# Patient Record
Sex: FEMALE | URBAN - METROPOLITAN AREA
[De-identification: names, ages, dates, MRNs, and addresses within clinical notes are randomized per-mention and may not be internally consistent; named-entity substitution may affect disease eponyms.]

---

## 2022-02-21 ENCOUNTER — COSMETIC (OUTPATIENT)
Dept: PLASTIC SURGERY | Facility: CLINIC | Age: 32
End: 2022-02-21

## 2022-02-21 VITALS
RESPIRATION RATE: 16 BRPM | HEART RATE: 87 BPM | TEMPERATURE: 97.5 F | SYSTOLIC BLOOD PRESSURE: 140 MMHG | WEIGHT: 184.8 LBS | DIASTOLIC BLOOD PRESSURE: 101 MMHG

## 2022-02-21 DIAGNOSIS — Z41.1 ENCOUNTER FOR COSMETIC PROCEDURE: Primary | ICD-10-CM

## 2022-02-21 PROCEDURE — RECHECK: Performed by: PLASTIC SURGERY

## 2022-02-21 RX ORDER — AZITHROMYCIN 250 MG/1
250 TABLET, FILM COATED ORAL AS NEEDED
COMMUNITY
Start: 2022-01-18

## 2022-02-21 RX ORDER — BIOTIN 10 MG
10 TABLET ORAL DAILY
COMMUNITY

## 2022-02-21 RX ORDER — BENZONATATE 100 MG/1
100 CAPSULE ORAL 2 TIMES DAILY
COMMUNITY
Start: 2022-01-15 | End: 2022-04-11 | Stop reason: ALTCHOICE

## 2022-02-21 RX ORDER — ALBUTEROL SULFATE 90 UG/1
2 AEROSOL, METERED RESPIRATORY (INHALATION) 2 TIMES DAILY
COMMUNITY
Start: 2022-01-11 | End: 2022-02-21

## 2022-02-21 RX ORDER — ALBUTEROL SULFATE 90 UG/1
1 AEROSOL, METERED RESPIRATORY (INHALATION) AS NEEDED
COMMUNITY
Start: 2022-02-18 | End: 2022-02-21

## 2022-02-21 RX ORDER — LORAZEPAM 0.5 MG/1
0.5 TABLET ORAL DAILY
COMMUNITY
End: 2022-02-21

## 2022-02-21 RX ORDER — ANTIARTHRITIC COMBINATION NO.2 900 MG
250 TABLET ORAL DAILY
COMMUNITY

## 2022-02-21 RX ORDER — METHYLPREDNISOLONE 4 MG/1
4 TABLET ORAL DAILY
COMMUNITY
Start: 2022-01-18 | End: 2022-04-11 | Stop reason: ALTCHOICE

## 2022-02-21 RX ORDER — CYCLOBENZAPRINE HCL 5 MG
5 TABLET ORAL 3 TIMES DAILY
COMMUNITY
Start: 2021-11-22 | End: 2022-02-21

## 2022-02-21 NOTE — PROGRESS NOTES
Patient is a 79-year-old female who is known body contouring surgery from my previous practice  She was initially scheduled for a follow-up procedure for bilateral mastopexy liposuction and Brazilian butt lift with fat grafting to the breast   She was scheduled however due to the relocation of my practice her surgery was canceled and patient was rescheduled  Patient desired to follow me to my new practice in contacted my new office, she is here today to establish care and would  like to discuss her procedure as well as additional liposuction  The patient is interested in liposuction to the arms and inner thigh as well as more liposuction to her back  Physical exam--abdomen with excellent shape and contour, she has localized adiposity to the upper arm mainly the axillary region, inner thigh and mild residual localized adiposity in the upper flank  She overall has a very nice shape to her buttock but does have some decreased projection  Bilateral breasts with a grade 3 ptosis, she does have good shape and volume to her breasts, she has no palpable masses or adenopathy  She has good skin tone to her arms, she has good to moderate skin tone to the thighs    Discussion--I discussed with the patient that again she has overall very good candidate for suction assisted lipectomy and fat grafting to the buttock, I did discuss with her that her arms and thighs must be approached judiciously as she is at increased risk for loose hanging skin and may need a separate surgical procedure to correct if the skin does not retract, the patient understands and agrees is comfortable with that  Regarding her breast, the patient is an excellent candidate for mastopexy, liposuction and fat grafting to her breast and buttock    We once again reviewed the risks, benefits, alternatives, all the patient's questions were answered to her satisfaction, she will be provided pricing information literature regarding the procedure  Counseling dominated visit: Total counseling time 30 minutes, total visit time 35 minutes

## 2022-03-21 ENCOUNTER — COSMETIC (OUTPATIENT)
Dept: PLASTIC SURGERY | Facility: CLINIC | Age: 32
End: 2022-03-21

## 2022-03-21 VITALS
HEART RATE: 94 BPM | WEIGHT: 188 LBS | DIASTOLIC BLOOD PRESSURE: 72 MMHG | SYSTOLIC BLOOD PRESSURE: 111 MMHG | HEIGHT: 64 IN | TEMPERATURE: 97.8 F | BODY MASS INDEX: 32.1 KG/M2

## 2022-03-21 DIAGNOSIS — Z41.1 ENCOUNTER FOR COSMETIC PROCEDURE: Primary | ICD-10-CM

## 2022-03-21 PROCEDURE — RECHECK: Performed by: PLASTIC SURGERY

## 2022-03-21 NOTE — PROGRESS NOTES
Assessment/Plan   21-year-old female for mastopexy, and fat grafting to the bilateral breasts and buttock  1 )  We discussed the risks, benefits, alternatives of mastopexy and fat grafting the bilateral breast and buttock and we identified her donor sites which would be her abdomen flanks arms and thighs   2 )  Informed consent obtained and signed   3  )  Labs pending, pictures taken  4 )  Will obtain breast measurements day of surgery      Discussion- I discussed with her the risks, benefits, alternatives of mastopexy including the risk of bleeding, infection, scarring, poor wound healing, demonstrating an underlying structures, poor aesthetic result, I discussed with her that her breasts are somewhat asymmetric now and he will have some degree of asymmetry postoperatively, I discussed the risk of change in nipple sensation, loss of nipple, damage to the nipple-areolar complex, seroma, DVT, poor aesthetic result, need for revision, need for multiple procedures, I discussed with him the expected recovery time, the expected hospital stay  I discussed with her the difference between mastopexy and a formal breast reduction, pictures were used to aid the patient's understanding  We discussed fat grafting to the bilateral upper pole of the breast as well as fat grafting to the buttock  I discussed with her the risks would include the above as well as the risk of under correction, over-correction fat resorption, contour deformity, fat necrosis, poor aesthetic result, asymmetry, the risk of fat embolus, all the patient's questions were answered to her satisfaction informed consent obtained and signed, we will proceed to the OR as scheduled  Subjective   Patient ID: Akil Daniel is a 36 y o  female  There were no vitals filed for this visit    HPI patient is a 21-year-old female known to me for previous abdominoplasty, the patient was very happy with her results and saw me in consultation for mastopexy with fat grafting to the bilateral breast and buttock  The patient has no new changes to her history, she is on no new medication, she is a nonsmoker, she does not take steroids or blood thinners, she is otherwise doing well  The following portions of the patient's history were reviewed and updated as appropriate: allergies, current medications, past family history, past medical history, past social history, past surgical history and problem list     Review of Systems   Constitutional: Negative  Objective   Physical Exam   Constitutional  She appears well-developed and well-nourished  Eyes  Pupils are equal, round, and reactive to light  System normal      General -             Right: Right eye extraocular movements are normal              Left: Left eye extraocular movements are normal      Cardiovascular: Normal rate, regular rhythm, normal heart sounds and intact distal pulses  Pulmonary/Chest  Effort normal and breath sounds normal      Skin  Skin is warm  Psychiatric  She has a normal mood and affect  Her behavior is normal  Judgment and thought content normal    Abdomen, soft, nontender, nondistended, plication intact  Bilateral breast--grade 3 ptosis, no palpable masses or adenopathy   Skin- localized adiposity to the flanks, upper back, arms and inner thighs, she also has relative decreased projection to her buttock    No past medical history on file  No past surgical history on file    Current Outpatient Medications   Medication Instructions    azithromycin (ZITHROMAX) 250 mg, Oral, As needed    benzonatate (TESSALON PERLES) 100 mg, Oral, 2 times daily    Biotin 10 mg, Oral, Daily    methylPREDNISolone 4 mg, Oral, Daily, Use as directed    Multiple Vitamins-Minerals (Womens Multi) CAPS 250 mg, Oral, Daily       Social History     Social History Narrative    Not on file     Social History     Tobacco Use   Smoking Status Never Smoker   Smokeless Tobacco Never Used

## 2022-04-11 RX ORDER — LORAZEPAM 0.5 MG/1
TABLET ORAL EVERY 8 HOURS PRN
COMMUNITY

## 2022-04-11 RX ORDER — ALBUTEROL SULFATE 90 UG/1
2 AEROSOL, METERED RESPIRATORY (INHALATION) EVERY 6 HOURS PRN
COMMUNITY

## 2022-04-11 NOTE — PRE-PROCEDURE INSTRUCTIONS
Pre-Surgery Instructions:   Medication Instructions    albuterol (PROVENTIL HFA,VENTOLIN HFA) 90 mcg/act inhaler Instructed patient per Anesthesia Guidelines  prn,may use    LORazepam (ATIVAN) 0 5 mg tablet Instructed patient per Anesthesia Guidelines  prn,may use    You will receive a phone call from hospital for arrival time  Please call surgeons office if any changes in your condition  Wear easy on/off clothing; consider type of surgery;  Valuables, jewelry, piercing's please keep at home  **COVID-19  education/surgical guidelines  Updated covid    Visitation policy  Please: No contact lenses or eye make up, artificial eyelashes    Please secure transportation     Follow pre surgery showering or cleaning instructions as  Reviewed by nurse or surgeons office      Questions answered and concerns addressed

## 2022-04-13 ENCOUNTER — ANESTHESIA EVENT (OUTPATIENT)
Dept: PERIOP | Facility: HOSPITAL | Age: 32
End: 2022-04-13
Payer: SELF-PAY

## 2022-04-14 ENCOUNTER — HOSPITAL ENCOUNTER (OUTPATIENT)
Facility: HOSPITAL | Age: 32
Setting detail: OUTPATIENT SURGERY
Discharge: HOME | End: 2022-04-14
Attending: PLASTIC SURGERY | Admitting: PLASTIC SURGERY
Payer: SELF-PAY

## 2022-04-14 ENCOUNTER — ANESTHESIA (OUTPATIENT)
Dept: PERIOP | Facility: HOSPITAL | Age: 32
End: 2022-04-14
Payer: SELF-PAY

## 2022-04-14 VITALS
TEMPERATURE: 97.2 F | HEART RATE: 99 BPM | SYSTOLIC BLOOD PRESSURE: 142 MMHG | DIASTOLIC BLOOD PRESSURE: 77 MMHG | OXYGEN SATURATION: 97 % | BODY MASS INDEX: 32.56 KG/M2 | RESPIRATION RATE: 18 BRPM | HEIGHT: 64 IN | WEIGHT: 190.7 LBS

## 2022-04-14 DIAGNOSIS — Z41.1 ENCOUNTER FOR COSMETIC PROCEDURE: ICD-10-CM

## 2022-04-14 LAB
ABO GROUP BLD: NORMAL
ABO GROUP BLD: NORMAL
ALBUMIN SERPL BCP-MCNC: 3.9 G/DL (ref 3.5–5)
ALP SERPL-CCNC: 65 U/L (ref 46–116)
ALT SERPL W P-5'-P-CCNC: 27 U/L (ref 12–78)
ANION GAP SERPL CALCULATED.3IONS-SCNC: 8 MMOL/L (ref 4–13)
AST SERPL W P-5'-P-CCNC: 27 U/L (ref 5–45)
BASE EXCESS BLDA CALC-SCNC: -5 MMOL/L (ref -2–3)
BASOPHILS # BLD AUTO: 0.02 THOUSANDS/ΜL (ref 0–0.1)
BASOPHILS NFR BLD AUTO: 0 % (ref 0–1)
BILIRUB SERPL-MCNC: 0.37 MG/DL (ref 0.2–1)
BLD GP AB SCN SERPL QL: NEGATIVE
BUN SERPL-MCNC: 18 MG/DL (ref 5–25)
CA-I BLD-SCNC: 1.14 MMOL/L (ref 1.12–1.32)
CALCIUM SERPL-MCNC: 9 MG/DL (ref 8.3–10.1)
CHLORIDE SERPL-SCNC: 104 MMOL/L (ref 100–108)
CO2 SERPL-SCNC: 26 MMOL/L (ref 21–32)
CREAT SERPL-MCNC: 0.64 MG/DL (ref 0.6–1.3)
EOSINOPHIL # BLD AUTO: 0.21 THOUSAND/ΜL (ref 0–0.61)
EOSINOPHIL NFR BLD AUTO: 3 % (ref 0–6)
ERYTHROCYTE [DISTWIDTH] IN BLOOD BY AUTOMATED COUNT: 12.8 % (ref 11.6–15.1)
GFR SERPL CREATININE-BSD FRML MDRD: 120 ML/MIN/1.73SQ M
GLUCOSE SERPL-MCNC: 162 MG/DL (ref 65–140)
GLUCOSE SERPL-MCNC: 88 MG/DL (ref 65–140)
HCO3 BLDA-SCNC: 21.5 MMOL/L (ref 24–30)
HCT VFR BLD AUTO: 44.5 % (ref 34.8–46.1)
HCT VFR BLD CALC: 34 % (ref 34.8–46.1)
HGB BLD-MCNC: 15.1 G/DL (ref 11.5–15.4)
HGB BLDA-MCNC: 11.6 G/DL (ref 11.5–15.4)
IMM GRANULOCYTES # BLD AUTO: 0.02 THOUSAND/UL (ref 0–0.2)
IMM GRANULOCYTES NFR BLD AUTO: 0 % (ref 0–2)
LYMPHOCYTES # BLD AUTO: 3.49 THOUSANDS/ΜL (ref 0.6–4.47)
LYMPHOCYTES NFR BLD AUTO: 49 % (ref 14–44)
MCH RBC QN AUTO: 31.4 PG (ref 26.8–34.3)
MCHC RBC AUTO-ENTMCNC: 33.9 G/DL (ref 31.4–37.4)
MCV RBC AUTO: 93 FL (ref 82–98)
MONOCYTES # BLD AUTO: 0.41 THOUSAND/ΜL (ref 0.17–1.22)
MONOCYTES NFR BLD AUTO: 6 % (ref 4–12)
NEUTROPHILS # BLD AUTO: 3.01 THOUSANDS/ΜL (ref 1.85–7.62)
NEUTS SEG NFR BLD AUTO: 42 % (ref 43–75)
NRBC BLD AUTO-RTO: 0 /100 WBCS
PCO2 BLD: 23 MMOL/L (ref 21–32)
PCO2 BLD: 46.4 MM HG (ref 42–50)
PH BLD: 7.27 [PH] (ref 7.3–7.4)
PLATELET # BLD AUTO: 333 THOUSANDS/UL (ref 149–390)
PMV BLD AUTO: 9.2 FL (ref 8.9–12.7)
PO2 BLD: 148 MM HG (ref 35–45)
POTASSIUM BLD-SCNC: 3.4 MMOL/L (ref 3.5–5.3)
POTASSIUM SERPL-SCNC: 4.1 MMOL/L (ref 3.5–5.3)
PROT SERPL-MCNC: 7.9 G/DL (ref 6.4–8.2)
RBC # BLD AUTO: 4.81 MILLION/UL (ref 3.81–5.12)
RH BLD: POSITIVE
RH BLD: POSITIVE
SAO2 % BLD FROM PO2: 99 % (ref 60–85)
SODIUM BLD-SCNC: 142 MMOL/L (ref 136–145)
SODIUM SERPL-SCNC: 138 MMOL/L (ref 136–145)
SPECIMEN EXPIRATION DATE: NORMAL
SPECIMEN SOURCE: ABNORMAL
WBC # BLD AUTO: 7.16 THOUSAND/UL (ref 4.31–10.16)

## 2022-04-14 PROCEDURE — 86901 BLOOD TYPING SEROLOGIC RH(D): CPT | Performed by: NURSE ANESTHETIST, CERTIFIED REGISTERED

## 2022-04-14 PROCEDURE — 84295 ASSAY OF SERUM SODIUM: CPT

## 2022-04-14 PROCEDURE — 15878 SUCTION LIPECTOMY UPR EXTREM: CPT | Performed by: PLASTIC SURGERY

## 2022-04-14 PROCEDURE — 15771 GRFG AUTOL FAT LIPO 50 CC/<: CPT | Performed by: PLASTIC SURGERY

## 2022-04-14 PROCEDURE — 86850 RBC ANTIBODY SCREEN: CPT | Performed by: NURSE ANESTHETIST, CERTIFIED REGISTERED

## 2022-04-14 PROCEDURE — 85014 HEMATOCRIT: CPT

## 2022-04-14 PROCEDURE — 19316 MASTOPEXY: CPT | Performed by: PLASTIC SURGERY

## 2022-04-14 PROCEDURE — 85025 COMPLETE CBC W/AUTO DIFF WBC: CPT | Performed by: PLASTIC SURGERY

## 2022-04-14 PROCEDURE — 80053 COMPREHEN METABOLIC PANEL: CPT | Performed by: PLASTIC SURGERY

## 2022-04-14 PROCEDURE — 15772 GRFG AUTOL FAT LIPO EA ADDL: CPT | Performed by: PLASTIC SURGERY

## 2022-04-14 PROCEDURE — 88305 TISSUE EXAM BY PATHOLOGIST: CPT | Performed by: PATHOLOGY

## 2022-04-14 PROCEDURE — C9290 INJ, BUPIVACAINE LIPOSOME: HCPCS | Performed by: PLASTIC SURGERY

## 2022-04-14 PROCEDURE — 84132 ASSAY OF SERUM POTASSIUM: CPT

## 2022-04-14 PROCEDURE — 82947 ASSAY GLUCOSE BLOOD QUANT: CPT

## 2022-04-14 PROCEDURE — 82803 BLOOD GASES ANY COMBINATION: CPT

## 2022-04-14 PROCEDURE — 15879 SUCTION LIPECTOMY LWR EXTREM: CPT | Performed by: PLASTIC SURGERY

## 2022-04-14 PROCEDURE — 99024 POSTOP FOLLOW-UP VISIT: CPT | Performed by: PLASTIC SURGERY

## 2022-04-14 PROCEDURE — 15877 SUCTION LIPECTOMY TRUNK: CPT | Performed by: PLASTIC SURGERY

## 2022-04-14 PROCEDURE — 86900 BLOOD TYPING SEROLOGIC ABO: CPT | Performed by: NURSE ANESTHETIST, CERTIFIED REGISTERED

## 2022-04-14 PROCEDURE — 82330 ASSAY OF CALCIUM: CPT

## 2022-04-14 RX ORDER — ALBUMIN, HUMAN INJ 5% 5 %
SOLUTION INTRAVENOUS CONTINUOUS PRN
Status: DISCONTINUED | OUTPATIENT
Start: 2022-04-14 | End: 2022-04-14

## 2022-04-14 RX ORDER — CEPHALEXIN 500 MG/1
500 CAPSULE ORAL EVERY 6 HOURS SCHEDULED
Qty: 28 CAPSULE | Refills: 0 | Status: SHIPPED | OUTPATIENT
Start: 2022-04-14 | End: 2022-04-21

## 2022-04-14 RX ORDER — IBUPROFEN 600 MG/1
600 TABLET ORAL EVERY 8 HOURS PRN
Qty: 21 TABLET | Refills: 0 | Status: SHIPPED | OUTPATIENT
Start: 2022-04-14

## 2022-04-14 RX ORDER — LIDOCAINE HYDROCHLORIDE 20 MG/ML
INJECTION, SOLUTION EPIDURAL; INFILTRATION; INTRACAUDAL; PERINEURAL AS NEEDED
Status: DISCONTINUED | OUTPATIENT
Start: 2022-04-14 | End: 2022-04-14

## 2022-04-14 RX ORDER — ACETAMINOPHEN 325 MG/1
975 TABLET ORAL ONCE AS NEEDED
Status: CANCELLED | OUTPATIENT
Start: 2022-04-14

## 2022-04-14 RX ORDER — LIDOCAINE HYDROCHLORIDE 10 MG/ML
0.5 INJECTION, SOLUTION EPIDURAL; INFILTRATION; INTRACAUDAL; PERINEURAL ONCE AS NEEDED
Status: DISCONTINUED | OUTPATIENT
Start: 2022-04-14 | End: 2022-04-14 | Stop reason: HOSPADM

## 2022-04-14 RX ORDER — ROCURONIUM BROMIDE 10 MG/ML
INJECTION, SOLUTION INTRAVENOUS AS NEEDED
Status: DISCONTINUED | OUTPATIENT
Start: 2022-04-14 | End: 2022-04-14

## 2022-04-14 RX ORDER — EPHEDRINE SULFATE 50 MG/ML
INJECTION INTRAVENOUS AS NEEDED
Status: DISCONTINUED | OUTPATIENT
Start: 2022-04-14 | End: 2022-04-14

## 2022-04-14 RX ORDER — PROMETHAZINE HYDROCHLORIDE 25 MG/ML
12.5 INJECTION, SOLUTION INTRAMUSCULAR; INTRAVENOUS ONCE AS NEEDED
Status: DISCONTINUED | OUTPATIENT
Start: 2022-04-14 | End: 2022-04-14 | Stop reason: HOSPADM

## 2022-04-14 RX ORDER — DEXAMETHASONE SODIUM PHOSPHATE 10 MG/ML
INJECTION, SOLUTION INTRAMUSCULAR; INTRAVENOUS AS NEEDED
Status: DISCONTINUED | OUTPATIENT
Start: 2022-04-14 | End: 2022-04-14

## 2022-04-14 RX ORDER — HYDROMORPHONE HCL 110MG/55ML
PATIENT CONTROLLED ANALGESIA SYRINGE INTRAVENOUS AS NEEDED
Status: DISCONTINUED | OUTPATIENT
Start: 2022-04-14 | End: 2022-04-14

## 2022-04-14 RX ORDER — MIDAZOLAM HYDROCHLORIDE 2 MG/2ML
INJECTION, SOLUTION INTRAMUSCULAR; INTRAVENOUS AS NEEDED
Status: DISCONTINUED | OUTPATIENT
Start: 2022-04-14 | End: 2022-04-14

## 2022-04-14 RX ORDER — SODIUM CHLORIDE, SODIUM LACTATE, POTASSIUM CHLORIDE, CALCIUM CHLORIDE 600; 310; 30; 20 MG/100ML; MG/100ML; MG/100ML; MG/100ML
50 INJECTION, SOLUTION INTRAVENOUS CONTINUOUS
Status: DISCONTINUED | OUTPATIENT
Start: 2022-04-14 | End: 2022-04-14 | Stop reason: HOSPADM

## 2022-04-14 RX ORDER — SCOLOPAMINE TRANSDERMAL SYSTEM 1 MG/1
1 PATCH, EXTENDED RELEASE TRANSDERMAL
Status: DISCONTINUED | OUTPATIENT
Start: 2022-04-14 | End: 2022-04-14 | Stop reason: HOSPADM

## 2022-04-14 RX ORDER — CEFAZOLIN SODIUM 2 G/50ML
2000 SOLUTION INTRAVENOUS ONCE
Status: COMPLETED | OUTPATIENT
Start: 2022-04-14 | End: 2022-04-14

## 2022-04-14 RX ORDER — ONDANSETRON 2 MG/ML
INJECTION INTRAMUSCULAR; INTRAVENOUS AS NEEDED
Status: DISCONTINUED | OUTPATIENT
Start: 2022-04-14 | End: 2022-04-14

## 2022-04-14 RX ORDER — GABAPENTIN 100 MG/1
100 CAPSULE ORAL 3 TIMES DAILY
Qty: 21 CAPSULE | Refills: 0 | Status: SHIPPED | OUTPATIENT
Start: 2022-04-14 | End: 2022-04-22 | Stop reason: SDUPTHER

## 2022-04-14 RX ORDER — ALBUTEROL SULFATE 2.5 MG/3ML
2.5 SOLUTION RESPIRATORY (INHALATION) ONCE AS NEEDED
Status: DISCONTINUED | OUTPATIENT
Start: 2022-04-14 | End: 2022-04-14 | Stop reason: HOSPADM

## 2022-04-14 RX ORDER — PROPOFOL 10 MG/ML
INJECTION, EMULSION INTRAVENOUS AS NEEDED
Status: DISCONTINUED | OUTPATIENT
Start: 2022-04-14 | End: 2022-04-14

## 2022-04-14 RX ORDER — GABAPENTIN 300 MG/1
300 CAPSULE ORAL ONCE
Status: COMPLETED | OUTPATIENT
Start: 2022-04-14 | End: 2022-04-14

## 2022-04-14 RX ORDER — PROPOFOL 10 MG/ML
INJECTION, EMULSION INTRAVENOUS CONTINUOUS PRN
Status: DISCONTINUED | OUTPATIENT
Start: 2022-04-14 | End: 2022-04-14

## 2022-04-14 RX ORDER — CEFAZOLIN SODIUM 1 G/3ML
INJECTION, POWDER, FOR SOLUTION INTRAMUSCULAR; INTRAVENOUS AS NEEDED
Status: DISCONTINUED | OUTPATIENT
Start: 2022-04-14 | End: 2022-04-14

## 2022-04-14 RX ORDER — FENTANYL CITRATE 50 UG/ML
INJECTION, SOLUTION INTRAMUSCULAR; INTRAVENOUS AS NEEDED
Status: DISCONTINUED | OUTPATIENT
Start: 2022-04-14 | End: 2022-04-14

## 2022-04-14 RX ORDER — DIPHENHYDRAMINE HYDROCHLORIDE 50 MG/ML
INJECTION INTRAMUSCULAR; INTRAVENOUS AS NEEDED
Status: DISCONTINUED | OUTPATIENT
Start: 2022-04-14 | End: 2022-04-14

## 2022-04-14 RX ORDER — MAGNESIUM HYDROXIDE 1200 MG/15ML
LIQUID ORAL AS NEEDED
Status: DISCONTINUED | OUTPATIENT
Start: 2022-04-14 | End: 2022-04-14 | Stop reason: HOSPADM

## 2022-04-14 RX ORDER — SODIUM CHLORIDE, SODIUM LACTATE, POTASSIUM CHLORIDE, CALCIUM CHLORIDE 600; 310; 30; 20 MG/100ML; MG/100ML; MG/100ML; MG/100ML
INJECTION, SOLUTION INTRAVENOUS CONTINUOUS PRN
Status: DISCONTINUED | OUTPATIENT
Start: 2022-04-14 | End: 2022-04-14

## 2022-04-14 RX ORDER — OXYCODONE HYDROCHLORIDE 5 MG/1
5 TABLET ORAL ONCE AS NEEDED
Status: CANCELLED | OUTPATIENT
Start: 2022-04-14

## 2022-04-14 RX ORDER — ACETAMINOPHEN 325 MG/1
975 TABLET ORAL ONCE
Status: COMPLETED | OUTPATIENT
Start: 2022-04-14 | End: 2022-04-14

## 2022-04-14 RX ORDER — OXYCODONE HYDROCHLORIDE 5 MG/1
5 TABLET ORAL ONCE AS NEEDED
Status: COMPLETED | OUTPATIENT
Start: 2022-04-14 | End: 2022-04-14

## 2022-04-14 RX ORDER — ACETAMINOPHEN 500 MG
1000 TABLET ORAL EVERY 6 HOURS PRN
Qty: 21 TABLET | Refills: 0 | Status: SHIPPED | OUTPATIENT
Start: 2022-04-14 | End: 2022-04-21

## 2022-04-14 RX ORDER — METOCLOPRAMIDE HYDROCHLORIDE 5 MG/ML
10 INJECTION INTRAMUSCULAR; INTRAVENOUS ONCE AS NEEDED
Status: DISCONTINUED | OUTPATIENT
Start: 2022-04-14 | End: 2022-04-14 | Stop reason: HOSPADM

## 2022-04-14 RX ORDER — ACETAMINOPHEN 325 MG/1
975 TABLET ORAL ONCE AS NEEDED
Status: DISCONTINUED | OUTPATIENT
Start: 2022-04-14 | End: 2022-04-14 | Stop reason: HOSPADM

## 2022-04-14 RX ORDER — HYDROMORPHONE HCL/PF 1 MG/ML
0.4 SYRINGE (ML) INJECTION
Status: DISCONTINUED | OUTPATIENT
Start: 2022-04-14 | End: 2022-04-14 | Stop reason: HOSPADM

## 2022-04-14 RX ORDER — OXYCODONE HYDROCHLORIDE 5 MG/1
5 TABLET ORAL EVERY 4 HOURS PRN
Qty: 10 TABLET | Refills: 0 | Status: SHIPPED | OUTPATIENT
Start: 2022-04-14 | End: 2022-04-15 | Stop reason: SDUPTHER

## 2022-04-14 RX ORDER — GLYCOPYRROLATE 0.2 MG/ML
INJECTION INTRAMUSCULAR; INTRAVENOUS AS NEEDED
Status: DISCONTINUED | OUTPATIENT
Start: 2022-04-14 | End: 2022-04-14

## 2022-04-14 RX ORDER — FENTANYL CITRATE/PF 50 MCG/ML
50 SYRINGE (ML) INJECTION
Status: COMPLETED | OUTPATIENT
Start: 2022-04-14 | End: 2022-04-14

## 2022-04-14 RX ORDER — CYCLOBENZAPRINE HCL 5 MG
5 TABLET ORAL 3 TIMES DAILY PRN
Qty: 21 TABLET | Refills: 0 | Status: SHIPPED | OUTPATIENT
Start: 2022-04-14 | End: 2022-04-22 | Stop reason: SDUPTHER

## 2022-04-14 RX ORDER — SODIUM CHLORIDE 9 MG/ML
INJECTION, SOLUTION INTRAVENOUS CONTINUOUS PRN
Status: DISCONTINUED | OUTPATIENT
Start: 2022-04-14 | End: 2022-04-14

## 2022-04-14 RX ORDER — DEXMEDETOMIDINE HYDROCHLORIDE 100 UG/ML
INJECTION, SOLUTION INTRAVENOUS AS NEEDED
Status: DISCONTINUED | OUTPATIENT
Start: 2022-04-14 | End: 2022-04-14

## 2022-04-14 RX ORDER — ONDANSETRON 2 MG/ML
4 INJECTION INTRAMUSCULAR; INTRAVENOUS ONCE AS NEEDED
Status: DISCONTINUED | OUTPATIENT
Start: 2022-04-14 | End: 2022-04-14 | Stop reason: HOSPADM

## 2022-04-14 RX ADMIN — FENTANYL CITRATE 50 MCG: 50 INJECTION, SOLUTION INTRAMUSCULAR; INTRAVENOUS at 16:39

## 2022-04-14 RX ADMIN — OXYCODONE HYDROCHLORIDE 5 MG: 5 TABLET ORAL at 17:59

## 2022-04-14 RX ADMIN — ROCURONIUM BROMIDE 20 MG: 10 INJECTION, SOLUTION INTRAVENOUS at 13:30

## 2022-04-14 RX ADMIN — GABAPENTIN 300 MG: 300 CAPSULE ORAL at 07:04

## 2022-04-14 RX ADMIN — DEXMEDETOMIDINE HCL 4 MCG: 100 INJECTION INTRAVENOUS at 08:15

## 2022-04-14 RX ADMIN — DEXMEDETOMIDINE HCL 8 MCG: 100 INJECTION INTRAVENOUS at 09:26

## 2022-04-14 RX ADMIN — ROCURONIUM BROMIDE 10 MG: 10 INJECTION, SOLUTION INTRAVENOUS at 11:00

## 2022-04-14 RX ADMIN — SODIUM CHLORIDE, SODIUM LACTATE, POTASSIUM CHLORIDE, AND CALCIUM CHLORIDE: .6; .31; .03; .02 INJECTION, SOLUTION INTRAVENOUS at 13:31

## 2022-04-14 RX ADMIN — FENTANYL CITRATE 50 MCG: 50 INJECTION, SOLUTION INTRAMUSCULAR; INTRAVENOUS at 09:33

## 2022-04-14 RX ADMIN — MIDAZOLAM HYDROCHLORIDE 2 MG: 1 INJECTION, SOLUTION INTRAMUSCULAR; INTRAVENOUS at 08:01

## 2022-04-14 RX ADMIN — ACETAMINOPHEN 975 MG: 325 TABLET ORAL at 07:04

## 2022-04-14 RX ADMIN — GLYCOPYRROLATE 0.1 MG: 0.2 INJECTION, SOLUTION INTRAMUSCULAR; INTRAVENOUS at 08:01

## 2022-04-14 RX ADMIN — PROPOFOL 25 MCG/KG/MIN: 10 INJECTION, EMULSION INTRAVENOUS at 09:56

## 2022-04-14 RX ADMIN — DEXMEDETOMIDINE HCL 4 MCG: 100 INJECTION INTRAVENOUS at 09:45

## 2022-04-14 RX ADMIN — FENTANYL CITRATE 100 MCG: 50 INJECTION, SOLUTION INTRAMUSCULAR; INTRAVENOUS at 08:05

## 2022-04-14 RX ADMIN — DEXMEDETOMIDINE HCL 8 MCG: 100 INJECTION INTRAVENOUS at 09:40

## 2022-04-14 RX ADMIN — DEXMEDETOMIDINE HCL 8 MCG: 100 INJECTION INTRAVENOUS at 08:10

## 2022-04-14 RX ADMIN — PROPOFOL 150 MG: 10 INJECTION, EMULSION INTRAVENOUS at 08:07

## 2022-04-14 RX ADMIN — SCOPALAMINE 1 PATCH: 1 PATCH, EXTENDED RELEASE TRANSDERMAL at 08:50

## 2022-04-14 RX ADMIN — LIDOCAINE HYDROCHLORIDE 100 MG: 20 INJECTION, SOLUTION EPIDURAL; INFILTRATION; INTRACAUDAL; PERINEURAL at 08:07

## 2022-04-14 RX ADMIN — DEXAMETHASONE SODIUM PHOSPHATE 10 MG: 10 INJECTION, SOLUTION INTRAMUSCULAR; INTRAVENOUS at 08:10

## 2022-04-14 RX ADMIN — SODIUM CHLORIDE, SODIUM LACTATE, POTASSIUM CHLORIDE, AND CALCIUM CHLORIDE: .6; .31; .03; .02 INJECTION, SOLUTION INTRAVENOUS at 11:50

## 2022-04-14 RX ADMIN — DIPHENHYDRAMINE HYDROCHLORIDE 12.5 MG: 50 INJECTION, SOLUTION INTRAMUSCULAR; INTRAVENOUS at 09:56

## 2022-04-14 RX ADMIN — ROCURONIUM BROMIDE 50 MG: 10 INJECTION, SOLUTION INTRAVENOUS at 08:07

## 2022-04-14 RX ADMIN — ALBUMIN (HUMAN): 12.5 INJECTION, SOLUTION INTRAVENOUS at 14:49

## 2022-04-14 RX ADMIN — ROCURONIUM BROMIDE 20 MG: 10 INJECTION, SOLUTION INTRAVENOUS at 12:30

## 2022-04-14 RX ADMIN — SODIUM CHLORIDE, SODIUM LACTATE, POTASSIUM CHLORIDE, AND CALCIUM CHLORIDE 50 ML/HR: .6; .31; .03; .02 INJECTION, SOLUTION INTRAVENOUS at 07:10

## 2022-04-14 RX ADMIN — HYDROMORPHONE HYDROCHLORIDE 0.6 MG: 2 INJECTION, SOLUTION INTRAMUSCULAR; INTRAVENOUS; SUBCUTANEOUS at 09:14

## 2022-04-14 RX ADMIN — FENTANYL CITRATE 50 MCG: 50 INJECTION, SOLUTION INTRAMUSCULAR; INTRAVENOUS at 16:57

## 2022-04-14 RX ADMIN — ALBUMIN (HUMAN): 12.5 INJECTION, SOLUTION INTRAVENOUS at 14:19

## 2022-04-14 RX ADMIN — FENTANYL CITRATE 50 MCG: 50 INJECTION, SOLUTION INTRAMUSCULAR; INTRAVENOUS at 09:26

## 2022-04-14 RX ADMIN — FENTANYL CITRATE 50 MCG: 50 INJECTION, SOLUTION INTRAMUSCULAR; INTRAVENOUS at 16:16

## 2022-04-14 RX ADMIN — EPHEDRINE SULFATE 10 MG: 50 INJECTION, SOLUTION INTRAVENOUS at 14:17

## 2022-04-14 RX ADMIN — CEFAZOLIN SODIUM 2000 MG: 2 SOLUTION INTRAVENOUS at 07:47

## 2022-04-14 RX ADMIN — ROCURONIUM BROMIDE 20 MG: 10 INJECTION, SOLUTION INTRAVENOUS at 10:30

## 2022-04-14 RX ADMIN — ENOXAPARIN SODIUM 40 MG: 40 INJECTION SUBCUTANEOUS at 07:04

## 2022-04-14 RX ADMIN — GLYCOPYRROLATE 0.1 MG: 0.2 INJECTION, SOLUTION INTRAMUSCULAR; INTRAVENOUS at 08:05

## 2022-04-14 RX ADMIN — REMIFENTANIL HYDROCHLORIDE 0.1 MCG/KG/MIN: 1 INJECTION, POWDER, LYOPHILIZED, FOR SOLUTION INTRAVENOUS at 10:18

## 2022-04-14 RX ADMIN — ROCURONIUM BROMIDE 20 MG: 10 INJECTION, SOLUTION INTRAVENOUS at 09:00

## 2022-04-14 RX ADMIN — ONDANSETRON 4 MG: 2 INJECTION INTRAMUSCULAR; INTRAVENOUS at 14:46

## 2022-04-14 RX ADMIN — FENTANYL CITRATE 50 MCG: 50 INJECTION, SOLUTION INTRAMUSCULAR; INTRAVENOUS at 16:05

## 2022-04-14 RX ADMIN — CEFAZOLIN SODIUM 1000 MG: 1 INJECTION, POWDER, FOR SOLUTION INTRAMUSCULAR; INTRAVENOUS at 08:50

## 2022-04-14 RX ADMIN — ROCURONIUM BROMIDE 10 MG: 10 INJECTION, SOLUTION INTRAVENOUS at 09:30

## 2022-04-14 RX ADMIN — DEXMEDETOMIDINE HCL 8 MCG: 100 INJECTION INTRAVENOUS at 08:07

## 2022-04-14 RX ADMIN — CEFAZOLIN SODIUM 1000 MG: 1 INJECTION, POWDER, FOR SOLUTION INTRAMUSCULAR; INTRAVENOUS at 12:50

## 2022-04-14 RX ADMIN — SODIUM CHLORIDE: 0.9 INJECTION, SOLUTION INTRAVENOUS at 08:15

## 2022-04-14 RX ADMIN — SUGAMMADEX 200 MG: 100 INJECTION, SOLUTION INTRAVENOUS at 15:30

## 2022-04-14 NOTE — H&P
Assessment/Plan   61-year-old female for mastopexy, and fat grafting to the bilateral breasts and buttock  1 )  We discussed the risks, benefits, alternatives of mastopexy and fat grafting the bilateral breast and buttock and we identified her donor sites which would be her abdomen flanks arms and thighs   2 )  Informed consent obtained and signed   3  )  Labs pending, pictures taken  4 )  Will obtain breast measurements day of surgery        Discussion- I discussed with her the risks, benefits, alternatives of mastopexy including the risk of bleeding, infection, scarring, poor wound healing, demonstrating an underlying structures, poor aesthetic result, I discussed with her that her breasts are somewhat asymmetric now and he will have some degree of asymmetry postoperatively, I discussed the risk of change in nipple sensation, loss of nipple, damage to the nipple-areolar complex, seroma, DVT, poor aesthetic result, need for revision, need for multiple procedures, I discussed with him the expected recovery time, the expected hospital stay  I discussed with her the difference between mastopexy and a formal breast reduction, pictures were used to aid the patient's understanding  We discussed fat grafting to the bilateral upper pole of the breast as well as fat grafting to the buttock  I discussed with her the risks would include the above as well as the risk of under correction, over-correction fat resorption, contour deformity, fat necrosis, poor aesthetic result, asymmetry, the risk of fat embolus, all the patient's questions were answered to her satisfaction informed consent obtained and signed, we will proceed to the OR as scheduled         Subjective   Patient ID: Sydnee Madrigal is a 36 y o  female      There were no vitals filed for this visit    HPI patient is a 61-year-old female known to me for previous abdominoplasty, the patient was very happy with her results and saw me in consultation for mastopexy with fat grafting to the bilateral breast and buttock  The patient has no new changes to her history, she is on no new medication, she is a nonsmoker, she does not take steroids or blood thinners, she is otherwise doing well      The following portions of the patient's history were reviewed and updated as appropriate: allergies, current medications, past family history, past medical history, past social history, past surgical history and problem list      Review of Systems   Constitutional: Negative           Objective /64   Pulse 79   Temp 97 9 °F (36 6 °C) (Temporal)   Resp 19   Ht 5' 4" (1 626 m)   Wt 86 5 kg (190 lb 11 2 oz)   LMP 12/14/2021   SpO2 98%   Breastfeeding No Comment: tubal ligation  BMI 32 73 kg/m²     Physical Exam   Constitutional  She appears well-developed and well-nourished       Eyes  Pupils are equal, round, and reactive to light  System normal       General -             Right: Right eye extraocular movements are normal              Left: Left eye extraocular movements are normal       Cardiovascular: Normal rate, regular rhythm, normal heart sounds and intact distal pulses       Pulmonary/Chest  Effort normal and breath sounds normal       Skin  Skin is warm       Psychiatric  She has a normal mood and affect  Her behavior is normal  Judgment and thought content normal    Abdomen, soft, nontender, nondistended, plication intact  Bilateral breast--grade 3 ptosis, no palpable masses or adenopathy   Skin- localized adiposity to the flanks, upper back, arms and inner thighs, she also has relative decreased projection to her buttock     Medical History   No past medical history on file  Surgical History   No past surgical history on file       Current Outpatient Medications   Medication Instructions    azithromycin (ZITHROMAX) 250 mg, Oral, As needed    benzonatate (TESSALON PERLES) 100 mg, Oral, 2 times daily    Biotin 10 mg, Oral, Daily    methylPREDNISolone 4 mg, Oral, Daily, Use as directed    Multiple Vitamins-Minerals (Womens Multi) CAPS 250 mg, Oral, Daily         Social History          Social History Narrative    Not on file      Social History          Tobacco Use   Smoking Status Never Smoker   Smokeless Tobacco Never Used

## 2022-04-14 NOTE — ANESTHESIA POSTPROCEDURE EVALUATION
Post-Op Assessment Note    CV Status:  Stable  Pain Score: 2    Pain management: adequate     Mental Status:  Arousable and sleepy   Hydration Status:  Euvolemic   PONV Controlled:  Controlled   Airway Patency:  Patent  Airway: intubated      Post Op Vitals Reviewed: Yes      Staff: CRNA         No complications documented      /69 (04/14/22 1600)    Temp (!) 97 1 °F (36 2 °C) (04/14/22 1600)    Pulse (!) 111 (04/14/22 1600)   Resp 22 (04/14/22 1600)    SpO2 100 % (04/14/22 1600)

## 2022-04-14 NOTE — PROGRESS NOTES
Patient marked in pre-operative holding area  Reviewed markings with patient and the planned excision/reconstruction  All her questions were answered to her satisfaction, will proceed to OR as scheduled  We identified her donor sites including her flanks, inner thighs and abdomen  I also discussed with the patient that her buttocks is her top priority in terms of fat grafting in the event that there is not a large amount of injectable fat available  We also discussed that the patient would like to be slightly smaller from a breast standpoint  I discussed with her that we will likely excise a small amount of her lateral excess  Patient understands and agrees  She is without questions at this time  Will proceed to OR as scheduled  Labs reviewed

## 2022-04-14 NOTE — DISCHARGE INSTRUCTIONS
Bilateral Breast Lifts, Liposuction, Autologous Fat Grafting    1  Keep ANISH dressings on until seen by Dr Adiel Morales  a  If the light is blinking green, the battery pack is functioning properly  b  If the light blinks orange, hit the orange button to re-establish the seal, this will usually correct the problem, if the light continues to blink orange, the dressing will still continue to function  c  Call the office if the dressing becomes completely saturated  2  Keep surgical bra on at all times, ok to place ABD pads between ANISH dressings and bra for comfort, ok to open bra for comfort while in bed for short breaks  3  Keep arms wrapped with ACE wrapping until seen by Dr Adiel Morales  4  Keep legs wrapped with ACE wrapping for 2 days until you change to your compression garment  5  You may sponge bathe in 2 days  Keep ANISH dressings dry at all times  All Surgeries    1  You must be off all pain medications and have a clear field of vision before driving    2  For the next 24 hours:    a  Do not sign any legal documents or operate machinery  b  Have a responsible adult help you     c  Take it easy & rest     3  Clear liquids first, if no nausea, progress to soft diet, and then regular diet as tolerated  4  Take medications as ordered, and do not take any pain medication on an empty stomach  Once pain meds are finished you may alternate Tylenol & Advil as directed for pain    a  Make sure to take all antibiotics until completion    b  If lovenox or heparin was prescribed, use as directed until completion    5  Avoid alcoholic beverages  6  Resume any prior medications at home unless otherwise instructed by Dr Adiel Morales  7  Call Dr Adiel Morales at the office [phone] if:     a  Obvious bleeding, excessive swelling, and tenderness    b  Hardness of incision on palpation    c  Fever over 101 0°    d  Shortness of breath, severe calf or thigh pain      e  Redness, odor, or pus at the wound {some oozing is normal from incision sites and may  continue for several days ABD pads or feminine pads can be used for absorption )    f  Persistent vomiting or pain that is not relieved by your medication  g   To make your follow-up appointment , ask a question, or report a problem

## 2022-04-14 NOTE — ANESTHESIA PREPROCEDURE EVALUATION
Procedure:  BILATERAL MASTOPEXY, LIPOSUCTION FLANKS, ARMS, INNER THIGH, BBL & FAT GRAFTING TO BILATERAL BREASTS (Bilateral Breast)  TRANSFER FAT/INJECTION GRAFT (Bilateral Breast)    Relevant Problems   PULMONARY   (+) Asthma        Physical Exam    Airway    Mallampati score: II  TM Distance: >3 FB  Neck ROM: full     Dental   No notable dental hx     Cardiovascular  Cardiovascular exam normal    Pulmonary  Pulmonary exam normal Breath sounds clear to auscultation,     Other Findings        Anesthesia Plan  ASA Score- 2     Anesthesia Type- general with ASA Monitors  Additional Monitors:   Airway Plan: ETT  Plan Factors-    Chart reviewed  EKG reviewed  Imaging results reviewed  Existing labs reviewed  Patient summary reviewed  Patient is not a current smoker  Patient instructed to abstain from smoking on day of procedure  Patient did not smoke on day of surgery  Obstructive sleep apnea risk education given perioperatively  Induction- intravenous  Postoperative Plan- Plan for postoperative opioid use  Planned trial extubation    Informed Consent- Anesthetic plan and risks discussed with patient  I personally reviewed this patient with the CRNA  Discussed and agreed on the Anesthesia Plan with the CRNA             Lab Results   Component Value Date    WBC 7 16 04/14/2022    HGB 15 1 04/14/2022    HCT 44 5 04/14/2022    MCV 93 04/14/2022     04/14/2022           Discussed with pt the benefits/alternatives and risks or General Anesthesia including breathing tube remaining in place if not strong enough, PONV, damage to lips and teeth, sore throat, eye injury or blindness    I, Dr Aminah Marroquin, the attending physician, have personally seen and evaluated the patient prior to anesthetic care  I have reviewed the pre-anesthetic record, and other medical records if appropriate to the anesthetic care   If a CRNA is involved in the case, I have reviewed the CRNA assessment, if present, and agree  The patient is in a suitable condition to proceed with my formulated anesthetic plan

## 2022-04-15 NOTE — OP NOTE
OPERATIVE REPORT  PATIENT NAME: Yomaira Miranda    :  1990  MRN: [id number]  Pt Location: [facility] ROOM 02    SURGERY DATE: 2022    Surgeon(s) and Role:     * Serafin March MD - Primary     * Carlos Farrell MD - Assisting    Preop Diagnosis:  Bilateral breast ptosis, localized adiposity to the flank/lower back, upper back area, inner thighs, upper abdomen, bilateral arms, decreased to projection to the buttock bilaterally    Post-Op Diagnosis Codes:    Same    Procedure:  Bilateral mastopexy, liposuction to the abdomen for donor site for fat grafting to the bilateral breast and buttock, liposuction to the bilateral flank/lower back area, upper back area, bilateral thighs, bilateral arms, breast augmentation with fat grafting to the bilateral upper pole of the breast, 100 mL right breast, 90 mL left breast, Brazilian butt lift with fat grafting to the bilateral lower buttock, 100 mL left buttock, 110 mL right buttock, application of bilateral non DME negative pressure wound therapy dressing to the bilateral breast each less than 50 centimeter squared  Specimen(s):  ID Type Source Tests Collected by Time Destination   1 : right breast tissue  Tissue Breast, Right TISSUE EXAM Serafin March MD 2022 10:44 AM    2 : left breast tissue  Tissue Breast, Left TISSUE EXAM Serafin March MD 2022 10:50 AM        Estimated Blood Loss:   50 mL    Drains:  [REMOVED] Urethral Catheter Non-latex 16 Fr  (Removed)   Number of days: 0       Anesthesia Type:   General    Operative Indications:  Encounter for cosmetic procedure [Z41 1]  As Above    Operative Findings:  Dictated    Complications:   None    Procedure and Technique:  Ms Dean Davies is a 24-year-old female who is known to me for previous body contouring procedure    The patient had an abdominal plasty previously, she had an umbilical hernia repair shortly thereafter with an umbilical plasty performed by myself after an acute injury at work  The patient saw me for consultation for her breast lift as she was unhappy with the sagging of her breasts, she also felt that she had excess adiposity in her bilateral thighs, upper arms, as well as continued adiposity to the upper abdomen, the lower back and flank area  The patient also felt that she had decreased projection to her buttock, she also was unhappy with the volume of her breast   I discussed with the patient the options for surgery including mastopexy alone, mastopexy augmentation, with implant, mastopexy/augmentation with fat grafting, fat grafting to the bilateral buttock, as well as extensive liposuction  After lengthy discussion with the patient we decided the best course of action was for bilateral mastopexy with augmentation with fat grafting, Brazilian butt lift with fat grafting to the bilateral lower buttock as well as extensive liposuction  We discussed the risks, benefits, alternatives including the risk of bleeding, infection, scarring, poor wound healing, damage to surrounding and underlying structures, need further surgery, need for multiple procedures, contour deformity, poor aesthetic result, asymmetry, loss or damage to the nipple-areolar complex, change in nipple sensation, high risk of wound healing complication at the triple point, seroma, DVT, fat resorption, under correction, over-correction, fat necrosis, need for revision, need for multiple surgeries, need for multiple procedures  We discussed the relative high risk of fat embolism due to fat grafting to the buttock, all the patient's questions were answered to her satisfaction, informed consent obtained and signed and the patient is brought to the hospital as an outpatient elective basis have the procedure performed      The patient was brought to the preoperative holding area where she was identified verbally, by site, and by timbo, she was given preoperative antibiotics, she was marked in the preoperative holding area, her SCDs were activated the patient was given Lovenox  At that time the patient was then brought to the OR where she was identified verbally, by site, and by armband the operating room table prior to the induction of anesthesia  After the induction of anesthesia was prepped and draped in the standard surgical fashion, time-out performed the surgical site was identified  Due to the length of the case it was ensured that the patient had adequate padding to all the pressure point areas, the once the patient was prepped and draped, the procedure began by 1st injecting tumescent into the identified areas of liposuction, the areas were tumesced sufficiently, adequate time was undertaken for the onset of the tumescent fluid  Care was taken to inject asymmetric amount of fluid in each site once the tumescent effect was noticed  Attention was then turned to the bilateral breast, the patient had a Wise pattern marked in the preoperative holding area, the patient did have a significant amount of ptosis  The marks were tailor tacked and once it was ensured that the marks were adequate to perform a closure and give the patient the best shape and contour, the nipple position was marked with the 38 cookie cutter  A 38 cookie cutter was then used to olivier the nipple and areola reduction  The entirety of the Wise pattern was then de-epithelialized  Because the patient did have some significant bulk to the lower pole of her breast, flaps were elevated raised the level of the chest wall, however no elevation was done inferiorly, giving the patient a both inferior and central mound type pedicle that was essentially the entirety of the breast   Once the flap was elevated, a small cuff of tissue around the nipple was excised and sent for specimen, this measured approximately 67 g on the right side and 41 g on the left    Once this was done, Bovie electrocautery was used to obtain meticulous hemostasis, the wound was copiously irrigated  At that time the wound was closed with a combination of 0 Vicryl suture to advance the flaps at the triple point and a combination of multiple 3-0 Monocryl suture as well as 2-0/3-0 Stratafix in the deep dermal layer and the superficial layer  The nipple was inset with a combination of 3-0 Monocryl and Stratafix  Once this was done the patient did have significant upper pole deficiency as well as overall decreased volume to her breast relatively  Once these areas were closed, attention was then turned to liposuction, using the MicroAire power assisted liposuction, and the lipoma grafter fat harvesting system, fat was harvested from the abdomen which was identified as her donor site  Once this was harvested, the fat was processed and injected into the bilateral breast, this was done using a 14 gauge Angiocath to injure torus the port sites, this done in the superficial plane with multiple planes and multiple passes and small aliquots of fat  It was felt that 100 mL in the right breast and 90 mL in the left breast gave the patient the best shape and contour  , the nipples were pink and viable with good capillary refill at the completion of the procedure  Liposuction then continue to the bilateral thighs, the bilateral lower and upper back area, and the bilateral arms  The endpoints of liposuction were the pinch test to ensure for symmetry in the return of bloody aspirate  In total approximately 1 L of fat was harvested for injection and the rest was disposed  Once this was completed the port sites were closed with 3-0 Monocryl suture  Because the patient's high risk of wound healing complication at the triple point, bilateral non DME negative pressure wound therapy dressings were applied  The wounds were closed, at this point the patient was then placed into the prone position  Once again it was ensured that all her pressure points were adequately padded    The patient was once again prepped and draped in the standard surgical fashion, time-out was once again performed in the surgical site identified  This point some additional liposuction was performed in the lower back, once this was performed, the patient underwent fat grafting to the bilateral buttock, this was again done by using a 14 gauge Angiocath introduced port sites, special care was taken to remain in the superficial layer, the fat grafting was done again with a 1 2 mL cannula in multiple planes and multiple passes as a small aliquots of fat in the superficial layer  Again approximately 100 mL of fat gave the patient a very good shape and contour on the left and 110 mL on the right  Once this was completed the port sites were closed with Dermabond, the patient tolerated the procedure well, she was extubated and in stable condition sent to PACU, there were no complications will monitor her status the patient will likely be discharged today         I was present for the entire procedure    Patient Disposition:  PACU       SIGNATURE: Beatrice Donnelly MD  DATE: April 15, 2022  TIME: 9:35 AM

## 2022-04-22 ENCOUNTER — OFFICE VISIT (OUTPATIENT)
Dept: PLASTIC SURGERY | Facility: CLINIC | Age: 32
End: 2022-04-22

## 2022-04-22 ENCOUNTER — TELEPHONE (OUTPATIENT)
Dept: OTHER | Facility: OTHER | Age: 32
End: 2022-04-22

## 2022-04-22 ENCOUNTER — TELEPHONE (OUTPATIENT)
Dept: BARIATRICS | Facility: CLINIC | Age: 32
End: 2022-04-22

## 2022-04-22 DIAGNOSIS — Z41.1 ENCOUNTER FOR COSMETIC PROCEDURE: ICD-10-CM

## 2022-04-22 PROCEDURE — 99024 POSTOP FOLLOW-UP VISIT: CPT | Performed by: PLASTIC SURGERY

## 2022-04-22 RX ORDER — GABAPENTIN 100 MG/1
300 CAPSULE ORAL 3 TIMES DAILY
Qty: 30 CAPSULE | Refills: 0 | Status: SHIPPED | OUTPATIENT
Start: 2022-04-22 | End: 2022-04-26

## 2022-04-22 RX ORDER — CYCLOBENZAPRINE HCL 5 MG
5 TABLET ORAL 3 TIMES DAILY PRN
Qty: 21 TABLET | Refills: 0 | Status: SHIPPED | OUTPATIENT
Start: 2022-04-22

## 2022-04-22 RX ORDER — OXYCODONE HYDROCHLORIDE 5 MG/1
5 TABLET ORAL EVERY 8 HOURS PRN
Qty: 10 TABLET | Refills: 0 | Status: SHIPPED | OUTPATIENT
Start: 2022-04-22 | End: 2022-05-04 | Stop reason: SDUPTHER

## 2022-04-22 NOTE — PROGRESS NOTES
The patient is a 62-year-old female status post bilateral mastopexy with extensive liposuction and fat grafting to the bilateral breast and buttock  Patient is overall doing very well, she states that she is having moderate pain, she states is well controlled with medication but is requesting a refill, she has no fever, chills, nausea, vomiting, no redness, swelling, drainage  Physical exam--bilateral breasts have excellent shape and contour, her nipples are viable and sensate, she has expected bruising and swelling to her breasts, and abdomen abdomen, her buttock has excellent shape and contour, there is no evidence of fat necrosis    Discussion--discussed with the patient that she can shower in the a m , she should continue local wound care with antibiotic ointment, she should continue her garment and refrain from extended sitting if possible, otherwise the patient is doing very well, she can follow-up in approximately 1 week for reassessment  We will refill her pain medications, overall the patient is doing very well, she has excellent shape and contour and so forth excellent aesthetic result

## 2022-05-06 ENCOUNTER — NURSE TRIAGE (OUTPATIENT)
Dept: OTHER | Facility: OTHER | Age: 32
End: 2022-05-06

## 2022-05-06 NOTE — TELEPHONE ENCOUNTER
Reason for Disposition   [1] INCREASING pain in incision AND [2] > 2 days (48 hours) since surgery    Answer Assessment - Initial Assessment Questions  1  SYMPTOM: "What's the main symptom you're concerned about?" (e g , redness, pain, drainage)      ? Infected incision  2  ONSET: "When did infection  start?"      Several days ago  3  SURGERY: "What surgery was performed?"        4  DATE of SURGERY: "When was surgery performed?"       4/14  5  INCISION SITE: "Where is the incision located?"       Under breast  6  REDNESS: "Is there any redness at the incision site?" If yes, ask: "How wide across is the redness?" (Inches, centimeters)       yes  7  PAIN: "Is there any pain?" If Yes, ask: "How bad is it?"  (Scale 1-10; or mild, moderate, severe)      yes  8  BLEEDING: "Is there any bleeding?" If Yes, ask: "How much?" and "Where?"      denies  9  DRAINAGE: "Is there any drainage from the incision site?" If yes, ask: "What color and how much?" (e g , red, cloudy, pus; drops, teaspoon)      Yellow/green  10  FEVER: "Do you have a fever?" If Yes, ask: "What is your temperature, how was it measured, and when did it start?"        Yes, 101-102  11   OTHER SYMPTOMS: "Do you have any other symptoms?" (e g , shaking chills, weakness, rash elsewhere on body)        Also with cold symptoms- cough and congestion    Protocols used: POST-OP INCISION SYMPTOMS AND QUESTIONS-ADULT-AH

## 2022-05-06 NOTE — TELEPHONE ENCOUNTER
Regarding: Had surgery underneath right breast is infected red, hot to touch   ----- Message from Jose Angel Mcneil sent at 5/6/2022  5:53 PM EDT -----  '' I had surgery on 4/14 and my right breast underneath is infected where the incision it's red, hot to touch and tender and there is a yellow color discharge coming out of it ''

## 2022-05-06 NOTE — TELEPHONE ENCOUNTER
Patient had surgery on 4/14  Several days ago patient started experiencing increased pain at the incision site under her right breast  She notes the incision is very warm, red, and tender to the touch  Reports white/yellow/greenish discharge  Patient also has had a fever of 101-102 along with a cough and congestion  Care advice given

## 2022-05-10 ENCOUNTER — OFFICE VISIT (OUTPATIENT)
Dept: PLASTIC SURGERY | Facility: CLINIC | Age: 32
End: 2022-05-10

## 2022-05-10 DIAGNOSIS — Z41.1 ENCOUNTER FOR COSMETIC PROCEDURE: Primary | ICD-10-CM

## 2022-05-10 PROCEDURE — 99024 POSTOP FOLLOW-UP VISIT: CPT | Performed by: PLASTIC SURGERY

## 2022-05-10 NOTE — PROGRESS NOTES
Patient is a 62-year-old female known to me status post bilateral mastopexy with bilateral fat grafting for the breast, and patient states over the weekend she had increasing on her right side as well as opening at the T-junction, she went to the emergency department as she was also experiencing some drainage at that spot and was given an antibiotic for possible infection  Otherwise the patient states that she is feeling much better, she has no fever, chills, nausea, vomiting  Physical exam--patient with very mild superficial opening at the T-junction on the right breast, she also has a superficial eschar on the left breast   She has some reactive erythema but there is no evidence of infection  She does not have any cellulitis, her shape has excellent shape and contour, there is no evidence of fat necrosis  Discussion--discussed with the patient that open at the T-junction is very common, she does not exhibit any overt signs of infection or cellulitis, she does have some reactive erythema, I discussed with her that I would recommend completing the course of antibiotic  I also discussed with her that she can discontinue her use of the antibiotic ointment as that may be contributing somewhat to the irritation, discussed with the patient that she can use Aquaphor or plain Vaseline, once the areas a close she can move to moisturizer or bio oil  Otherwise the patient overall is doing very well, she has excellent shape and contour, discussed with her the T-junction will heal without any affect on her overall result  Patient understands and agrees, patient can follow-up at her regularly scheduled appointment

## 2022-05-11 DIAGNOSIS — Z41.1 ENCOUNTER FOR COSMETIC PROCEDURE: ICD-10-CM

## 2022-05-11 RX ORDER — OXYCODONE HYDROCHLORIDE 5 MG/1
5 TABLET ORAL EVERY 6 HOURS PRN
Qty: 10 TABLET | Refills: 0 | Status: SHIPPED | OUTPATIENT
Start: 2022-05-11

## 2022-06-21 ENCOUNTER — TELEPHONE (OUTPATIENT)
Dept: PLASTIC SURGERY | Facility: CLINIC | Age: 32
End: 2022-06-21

## 2022-06-21 NOTE — TELEPHONE ENCOUNTER
Left message for patient to reschedule her appointment she no showed for today 6/21/22  Dr Sofía Glez would like to see her again  He said its not emergent so she can be seen at his next available, said pt may possibly be open to try the [place] office  Gave her the [facility] phone number to call back

## 2023-11-13 ENCOUNTER — TELEPHONE (OUTPATIENT)
Age: 33
End: 2023-11-13

## 2023-11-13 NOTE — TELEPHONE ENCOUNTER
Patient had Lipo from Dr Willy Gonzalez a year or so ago and called to find out what the price to have her excess skin removed would be. Annalee Pavon from the office said shell need to come in to talk with him so he can give all the info to janis and she will give a quote.

## 2024-05-21 ENCOUNTER — TELEPHONE (OUTPATIENT)
Age: 34
End: 2024-05-21

## 2024-05-21 NOTE — TELEPHONE ENCOUNTER
Tried to leave patient a message to reschedule her appointment on 7/23 ( Dr. Alfonso will be in surgery all day) but her voicemail box is full and I am unable to leave a message.

## (undated) DEVICE — SYRINGE 30ML LL

## (undated) DEVICE — ARTHROSCOPY FLOOR MAT

## (undated) DEVICE — DRAPE EQUIPMENT RF WAND

## (undated) DEVICE — SUT STRATAFIX SPIRAL 4-0 PGA/PCL 30 X 30 CM SXMD2B409

## (undated) DEVICE — SUT PLAIN 4-0 SC-1 18 IN 1828H

## (undated) DEVICE — ANTIBACTERIAL UNDYED BRAIDED (POLYGLACTIN 910), SYNTHETIC ABSORBABLE SUTURE: Brand: COATED VICRYL

## (undated) DEVICE — INVIEW CLEAR LEGGINGS: Brand: CONVERTORS

## (undated) DEVICE — ADHESIVE SKIN CLSR DERMABOND NX

## (undated) DEVICE — SUT VICRYL 0 CT-1 CR/8 27 IN JJ41G

## (undated) DEVICE — 3M™ STERI-STRIP™ REINFORCED ADHESIVE SKIN CLOSURES, R1547, 1/2 IN X 4 IN (12 MM X 100 MM), 6 STRIPS/ENVELOPE: Brand: 3M™ STERI-STRIP™

## (undated) DEVICE — JP CHAN DRN SIL HUBLESS 15FR W/TRO: Brand: CARDINAL HEALTH

## (undated) DEVICE — GLOVE SRG BIOGEL 7

## (undated) DEVICE — INTENDED FOR TISSUE SEPARATION, AND OTHER PROCEDURES THAT REQUIRE A SHARP SURGICAL BLADE TO PUNCTURE OR CUT.: Brand: BARD-PARKER SAFETY BLADES SIZE 10, STERILE

## (undated) DEVICE — SUT MONOCRYL 3-0 RB-1 27 IN Y305H

## (undated) DEVICE — HARVESTER FAT LIPOGRAFTER KIT

## (undated) DEVICE — DRAPE SHEET THREE QUARTER

## (undated) DEVICE — JACKSON-PRATT 100CC BULB RESERVOIR: Brand: CARDINAL HEALTH

## (undated) DEVICE — PROXIMATE PLUS MD MULTI-DIRECTIONAL RELEASE SKIN STAPLERS CONTAINS 35 STAINLESS STEEL STAPLES APPROXIMATE CLOSED DIMENSIONS: 6.9MM X 3.9MM WIDE: Brand: PROXIMATE

## (undated) DEVICE — GAUZE SPONGES,16 PLY: Brand: CURITY

## (undated) DEVICE — INTENDED FOR TISSUE SEPARATION, AND OTHER PROCEDURES THAT REQUIRE A SHARP SURGICAL BLADE TO PUNCTURE OR CUT.: Brand: BARD-PARKER ® CARBON RIB-BACK BLADES

## (undated) DEVICE — ELECTRODE NEEDLE MOD E-Z CLEAN 2.75IN 7CM -0013M

## (undated) DEVICE — IV CATH 14 G X 1.75

## (undated) DEVICE — Device

## (undated) DEVICE — INTENDED FOR TISSUE SEPARATION, AND OTHER PROCEDURES THAT REQUIRE A SHARP SURGICAL BLADE TO PUNCTURE OR CUT.: Brand: BARD-PARKER SAFETY BLADES SIZE 15, STERILE

## (undated) DEVICE — ACE WRAP 6 IN UNSTERILE

## (undated) DEVICE — SYRINGE 10ML LL

## (undated) DEVICE — BINDER ABDOMINAL 46-62 IN

## (undated) DEVICE — NEEDLE 25G X 1 1/2

## (undated) DEVICE — BOWL: 16OZ PEELPOUCH 75/CS 16/PLT: Brand: MEDEGEN MEDICAL PRODUCTS, LLC

## (undated) DEVICE — PLUMEPEN PRO 10FT

## (undated) DEVICE — BLADE 10 IN

## (undated) DEVICE — SUT STRATAFIX SPIRAL MONOCRYL PLUS 3-0 PS-2 45CM SXMP1B107

## (undated) DEVICE — ACE WRAP 6 IN STERILE

## (undated) DEVICE — MAGNETIC INSTRUMENT PAD 16" X 20"; LARGE; DISPOSABLE: Brand: CARDINAL HEALTH

## (undated) DEVICE — ADHESIVE SKIN HIGH VISCOSITY EXOFIN 1ML

## (undated) DEVICE — LIGHT HANDLE COVER SLEEVE DISP BLUE STELLAR

## (undated) DEVICE — UTILITY MARKER,BLACK WITH LABELS: Brand: DEVON

## (undated) DEVICE — SUT VICRYL 2-0 CT-1 27 IN J259H

## (undated) DEVICE — SUT MONOCRYL 3-0 PS-2 18 IN Y497G

## (undated) DEVICE — GLOVE INDICATOR PI UNDERGLOVE SZ 7 BLUE

## (undated) DEVICE — TUBING LIPOSUCTION ASPIRATION 12FT STERILE

## (undated) DEVICE — PROXIMATE SKIN STAPLERS (35 WIDE) CONTAINS 35 STAINLESS STEEL STAPLES (FIXED HEAD): Brand: PROXIMATE

## (undated) DEVICE — ELECTRODE BLADE MOD E-Z CLEAN  2.75IN 7CM -0012AM

## (undated) DEVICE — CHLORAPREP HI-LITE 26ML ORANGE

## (undated) DEVICE — SPONGE STICK WITH PVP-I: Brand: KENDALL

## (undated) DEVICE — ABDOMINAL PAD: Brand: DERMACEA

## (undated) DEVICE — SUT MONOCRYL 4-0 PS-2 18 IN Y496G

## (undated) DEVICE — STRL PENROSE DRAIN 18" X 3/4": Brand: CARDINAL HEALTH

## (undated) DEVICE — PICO 7 SINGLE 10X20CM: Brand: PICO™ 7

## (undated) DEVICE — STERILE MUSCLE FLAP PACK: Brand: CARDINAL HEALTH

## (undated) DEVICE — SUT VICRYL 3-0 SH 27 IN J416H